# Patient Record
Sex: FEMALE | Race: WHITE
[De-identification: names, ages, dates, MRNs, and addresses within clinical notes are randomized per-mention and may not be internally consistent; named-entity substitution may affect disease eponyms.]

---

## 2020-03-21 ENCOUNTER — HOSPITAL ENCOUNTER (OUTPATIENT)
Dept: HOSPITAL 62 - LC | Age: 30
Discharge: HOME | End: 2020-03-21
Attending: OBSTETRICS & GYNECOLOGY
Payer: COMMERCIAL

## 2020-03-21 DIAGNOSIS — Z3A.38: ICD-10-CM

## 2020-03-21 DIAGNOSIS — O47.1: Primary | ICD-10-CM

## 2020-03-21 LAB
APPEARANCE UR: CLEAR
APTT PPP: (no result) S
BARBITURATES UR QL SCN: NEGATIVE
BILIRUB UR QL STRIP: NEGATIVE
GLUCOSE UR STRIP-MCNC: NEGATIVE MG/DL
KETONES UR STRIP-MCNC: (no result) MG/DL
METHADONE UR QL SCN: NEGATIVE
NITRITE UR QL STRIP: NEGATIVE
PCP UR QL SCN: NEGATIVE
PH UR STRIP: 6 [PH] (ref 5–9)
PROT UR STRIP-MCNC: NEGATIVE MG/DL
SP GR UR STRIP: 1
URINE AMPHETAMINES SCREEN: NEGATIVE
URINE BENZODIAZEPINES SCREEN: NEGATIVE
URINE COCAINE SCREEN: NEGATIVE
URINE MARIJUANA (THC) SCREEN: NEGATIVE
UROBILINOGEN UR-MCNC: NEGATIVE MG/DL (ref ?–2)

## 2020-03-21 PROCEDURE — 80307 DRUG TEST PRSMV CHEM ANLYZR: CPT

## 2020-03-21 PROCEDURE — 81005 URINALYSIS: CPT

## 2020-03-21 PROCEDURE — 59025 FETAL NON-STRESS TEST: CPT

## 2020-03-21 PROCEDURE — 4A1HXCZ MONITORING OF PRODUCTS OF CONCEPTION, CARDIAC RATE, EXTERNAL APPROACH: ICD-10-PCS | Performed by: OBSTETRICS & GYNECOLOGY

## 2020-03-21 NOTE — NON STRESS TEST REPORT
=================================================================

Non Stress Test

=================================================================

Datetime Report Generated by CPN: 03/21/2020 16:24

   

   

=================================================================

DEMOGRAPHIC

=================================================================

   

EGA NST:  38.0

   

=================================================================

INDICATION

=================================================================

   

Indication for Study (NST) Other:  contractions

   

=================================================================

VITAL SIGNS

=================================================================

   

Temperature - NST:  98.0

Pulse - NST:  58

RESP - NST:  18

NBPSYS NST:  127

NBPDIA NST:  77

   

=================================================================

MONITORING

=================================================================

   

Monitor Explained:  Monitor Explained; Test Explained; Patient

   Verbalized Understanding

Time on Monitor:  03/21/2020 13:26

Time off Monitor:  03/21/2020 16:04

NST Duration:  158

   

=================================================================

NST INTERVENTIONS

=================================================================

   

NST Interventions:  PO Hydration

Physician Notified NST:  Dr eaton

BABY A:  [id number]

   

=================================================================

BABY A

=================================================================

   

Fetal Movement :  Present

Contraction Frequency :  5-8

FHR Baseline :  130

Accelerations :  15X15

Decelerations :  None

Variability :  Moderate 6-25bpm

NST Review:  Meets Criteria for Reactive NST

NST Review and Verified By :  PENNY Goodman RN

NSCHATO Results:  Reactive

   

=================================================================

NST REPORT

=================================================================

   

Report Trigger:  Send Report

## 2020-03-22 ENCOUNTER — HOSPITAL ENCOUNTER (INPATIENT)
Dept: HOSPITAL 62 - LC | Age: 30
LOS: 2 days | Discharge: HOME | End: 2020-03-24
Attending: OBSTETRICS & GYNECOLOGY | Admitting: OBSTETRICS & GYNECOLOGY
Payer: COMMERCIAL

## 2020-03-22 ENCOUNTER — HOSPITAL ENCOUNTER (OUTPATIENT)
Dept: HOSPITAL 62 - LC | Age: 30
Discharge: HOME | End: 2020-03-22
Attending: OBSTETRICS & GYNECOLOGY
Payer: COMMERCIAL

## 2020-03-22 DIAGNOSIS — Z87.891: ICD-10-CM

## 2020-03-22 DIAGNOSIS — Z3A.38: ICD-10-CM

## 2020-03-22 DIAGNOSIS — Z91.410: ICD-10-CM

## 2020-03-22 DIAGNOSIS — Z79.899: ICD-10-CM

## 2020-03-22 DIAGNOSIS — O47.1: Primary | ICD-10-CM

## 2020-03-22 DIAGNOSIS — A60.00: ICD-10-CM

## 2020-03-22 LAB
ADD MANUAL DIFF: NO
APPEARANCE UR: CLEAR
APPEARANCE UR: CLEAR
APTT PPP: (no result) S
APTT PPP: YELLOW S
BARBITURATES UR QL SCN: NEGATIVE
BARBITURATES UR QL SCN: NEGATIVE
BASOPHILS # BLD AUTO: 0 10^3/UL (ref 0–0.2)
BASOPHILS NFR BLD AUTO: 0.1 % (ref 0–2)
BILIRUB UR QL STRIP: NEGATIVE
BILIRUB UR QL STRIP: NEGATIVE
EOSINOPHIL # BLD AUTO: 0 10^3/UL (ref 0–0.6)
EOSINOPHIL NFR BLD AUTO: 0.3 % (ref 0–6)
ERYTHROCYTE [DISTWIDTH] IN BLOOD BY AUTOMATED COUNT: 14.6 % (ref 11.5–14)
GLUCOSE UR STRIP-MCNC: NEGATIVE MG/DL
GLUCOSE UR STRIP-MCNC: NEGATIVE MG/DL
HCT VFR BLD CALC: 36.4 % (ref 36–47)
HGB BLD-MCNC: 12.4 G/DL (ref 12–15.5)
KETONES UR STRIP-MCNC: (no result) MG/DL
KETONES UR STRIP-MCNC: NEGATIVE MG/DL
LYMPHOCYTES # BLD AUTO: 1.9 10^3/UL (ref 0.5–4.7)
LYMPHOCYTES NFR BLD AUTO: 14.3 % (ref 13–45)
MCH RBC QN AUTO: 24.9 PG (ref 27–33.4)
MCHC RBC AUTO-ENTMCNC: 34 G/DL (ref 32–36)
MCV RBC AUTO: 73 FL (ref 80–97)
METHADONE UR QL SCN: NEGATIVE
METHADONE UR QL SCN: NEGATIVE
MONOCYTES # BLD AUTO: 0.6 10^3/UL (ref 0.1–1.4)
MONOCYTES NFR BLD AUTO: 4.5 % (ref 3–13)
NEUTROPHILS # BLD AUTO: 10.7 10^3/UL (ref 1.7–8.2)
NEUTS SEG NFR BLD AUTO: 80.8 % (ref 42–78)
NITRITE UR QL STRIP: NEGATIVE
NITRITE UR QL STRIP: NEGATIVE
PCP UR QL SCN: NEGATIVE
PCP UR QL SCN: NEGATIVE
PH UR STRIP: 6 [PH] (ref 5–9)
PH UR STRIP: 7 [PH] (ref 5–9)
PLATELET # BLD: 321 10^3/UL (ref 150–450)
PROT UR STRIP-MCNC: NEGATIVE MG/DL
PROT UR STRIP-MCNC: NEGATIVE MG/DL
RBC # BLD AUTO: 4.97 10^6/UL (ref 3.72–5.28)
SP GR UR STRIP: 1
SP GR UR STRIP: 1
TOTAL CELLS COUNTED % (AUTO): 100 %
URINE AMPHETAMINES SCREEN: NEGATIVE
URINE AMPHETAMINES SCREEN: NEGATIVE
URINE BENZODIAZEPINES SCREEN: NEGATIVE
URINE BENZODIAZEPINES SCREEN: NEGATIVE
URINE COCAINE SCREEN: NEGATIVE
URINE COCAINE SCREEN: NEGATIVE
URINE MARIJUANA (THC) SCREEN: NEGATIVE
URINE MARIJUANA (THC) SCREEN: NEGATIVE
UROBILINOGEN UR-MCNC: NEGATIVE MG/DL (ref ?–2)
UROBILINOGEN UR-MCNC: NEGATIVE MG/DL (ref ?–2)
WBC # BLD AUTO: 13.3 10^3/UL (ref 4–10.5)

## 2020-03-22 PROCEDURE — 80307 DRUG TEST PRSMV CHEM ANLYZR: CPT

## 2020-03-22 PROCEDURE — 0UQGXZZ REPAIR VAGINA, EXTERNAL APPROACH: ICD-10-PCS | Performed by: OBSTETRICS & GYNECOLOGY

## 2020-03-22 PROCEDURE — 85027 COMPLETE CBC AUTOMATED: CPT

## 2020-03-22 PROCEDURE — 59025 FETAL NON-STRESS TEST: CPT

## 2020-03-22 PROCEDURE — 86592 SYPHILIS TEST NON-TREP QUAL: CPT

## 2020-03-22 PROCEDURE — 81005 URINALYSIS: CPT

## 2020-03-22 PROCEDURE — 86901 BLOOD TYPING SEROLOGIC RH(D): CPT

## 2020-03-22 PROCEDURE — 4A1HXCZ MONITORING OF PRODUCTS OF CONCEPTION, CARDIAC RATE, EXTERNAL APPROACH: ICD-10-PCS | Performed by: OBSTETRICS & GYNECOLOGY

## 2020-03-22 PROCEDURE — 86850 RBC ANTIBODY SCREEN: CPT

## 2020-03-22 PROCEDURE — 36415 COLL VENOUS BLD VENIPUNCTURE: CPT

## 2020-03-22 PROCEDURE — 85025 COMPLETE CBC W/AUTO DIFF WBC: CPT

## 2020-03-22 PROCEDURE — 86900 BLOOD TYPING SEROLOGIC ABO: CPT

## 2020-03-22 RX ADMIN — PENICILLIN G POTASSIUM SCH MLS/HR: 5000000 POWDER, FOR SOLUTION INTRAMUSCULAR; INTRAPLEURAL; INTRATHECAL; INTRAVENOUS at 18:00

## 2020-03-22 RX ADMIN — ACETAMINOPHEN AND CODEINE PHOSPHATE PRN EACH: 300; 30 TABLET ORAL at 21:47

## 2020-03-22 RX ADMIN — IBUPROFEN SCH: 800 TABLET, FILM COATED ORAL at 21:49

## 2020-03-22 RX ADMIN — PENICILLIN G POTASSIUM SCH: 5000000 POWDER, FOR SOLUTION INTRAMUSCULAR; INTRAPLEURAL; INTRATHECAL; INTRAVENOUS at 22:44

## 2020-03-22 RX ADMIN — FAMOTIDINE SCH: 20 TABLET, FILM COATED ORAL at 21:50

## 2020-03-22 NOTE — NON STRESS TEST REPORT
=================================================================

Non Stress Test

=================================================================

Datetime Report Generated by CPN: 03/22/2020 05:04

   

   

=================================================================

DEMOGRAPHIC

=================================================================

   

EGA NST:  38.1

   

=================================================================

INDICATION

=================================================================

   

Indication for Study (NST) Other:  greater than 32 weeks

   

=================================================================

URINE RESULTS

=================================================================

   

Urine Protein, NST:  Negative

Urine Ketones - NST:  Negative

Urine Glucose - NST:  Negative

Urine Blood - NST:  Positive

   

=================================================================

MONITORING

=================================================================

   

Monitor Explained:  Monitor Explained; Test Explained; Patient

   Verbalized Understanding

Time on Monitor:  03/22/2020 02:32

Time off Monitor:  03/22/2020 04:30

NST Duration:  118

   

=================================================================

NST INTERVENTIONS

=================================================================

   

NST Interventions:  PO Hydration; Reposition Patient

Physician Notified NST:  Dr. Erickson

BABY A:  [id number]

   

=================================================================

BABY A

=================================================================

   

Fetal Movement :  Present

Contraction Frequency :  Irregular

FHR Baseline :  135

Accelerations :  15X15

Decelerations :  None

Variability :  Moderate 6-25bpm

NST Review:  Meets Criteria for Reactive NST

NST Review and Verified By :  YOLI Rosa RN

NST Results:  Reactive

   

=================================================================

NST REPORT

=================================================================

   

Report Trigger:  Send Report
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
## 2020-03-23 LAB
ERYTHROCYTE [DISTWIDTH] IN BLOOD BY AUTOMATED COUNT: 14.4 % (ref 11.5–14)
HCT VFR BLD CALC: 35.9 % (ref 36–47)
HGB BLD-MCNC: 11.9 G/DL (ref 12–15.5)
MCH RBC QN AUTO: 24.7 PG (ref 27–33.4)
MCHC RBC AUTO-ENTMCNC: 33.1 G/DL (ref 32–36)
MCV RBC AUTO: 75 FL (ref 80–97)
PLATELET # BLD: 282 10^3/UL (ref 150–450)
RBC # BLD AUTO: 4.81 10^6/UL (ref 3.72–5.28)
WBC # BLD AUTO: 15.3 10^3/UL (ref 4–10.5)

## 2020-03-23 RX ADMIN — DOCUSATE SODIUM SCH MG: 100 CAPSULE, LIQUID FILLED ORAL at 09:23

## 2020-03-23 RX ADMIN — DOCUSATE SODIUM SCH MG: 100 CAPSULE, LIQUID FILLED ORAL at 17:12

## 2020-03-23 RX ADMIN — FERROUS SULFATE TAB 325 MG (65 MG ELEMENTAL FE) SCH MG: 325 (65 FE) TAB at 17:12

## 2020-03-23 RX ADMIN — SENNOSIDES, DOCUSATE SODIUM SCH EACH: 50; 8.6 TABLET, FILM COATED ORAL at 09:23

## 2020-03-23 RX ADMIN — FAMOTIDINE SCH MG: 20 TABLET, FILM COATED ORAL at 22:38

## 2020-03-23 RX ADMIN — FAMOTIDINE SCH: 20 TABLET, FILM COATED ORAL at 10:11

## 2020-03-23 RX ADMIN — Medication SCH CAP: at 09:23

## 2020-03-23 RX ADMIN — ACETAMINOPHEN AND CODEINE PHOSPHATE PRN EACH: 300; 30 TABLET ORAL at 06:16

## 2020-03-23 RX ADMIN — FERROUS SULFATE TAB 325 MG (65 MG ELEMENTAL FE) SCH MG: 325 (65 FE) TAB at 09:23

## 2020-03-23 RX ADMIN — IBUPROFEN SCH: 800 TABLET, FILM COATED ORAL at 05:59

## 2020-03-23 RX ADMIN — IBUPROFEN SCH: 800 TABLET, FILM COATED ORAL at 13:56

## 2020-03-23 RX ADMIN — IBUPROFEN SCH MG: 800 TABLET, FILM COATED ORAL at 22:38

## 2020-03-23 NOTE — PDOC PROGRESS REPORT
Subjective-OB


Progress Note for:: 03/23/20


Subjective: 


Pt doing well, no concerns. She reports light bleeding, reg diet and voiding 

without difficulty. 





Physical Exam (OB)


Vital Signs: 


                                        











Temp Pulse Resp BP Pulse Ox


 


 98.0 F   74   16   121/81   99 


 


 03/23/20 07:24  03/23/20 07:24  03/23/20 07:24  03/23/20 07:24  03/23/20 07:24








                                 Intake & Output











 03/22/20 03/23/20 03/24/20





 06:59 06:59 06:59


 


Intake Total  1260 


 


Balance  1260 


 


Weight  88.4 kg 














- Abdomen


Description: Soft


Hernia Present: No


Fundal Description: Firm


Fundal Height: u/u - u/2





Objective-Diagnostic


Laboratory: 


                                        





                                 03/23/20 07:19 





                                        











  03/22/20 03/22/20 03/22/20





  13:49 14:50 14:50


 


WBC   13.3 H 


 


RBC   4.97 


 


Hgb   12.4 


 


Hct   36.4 


 


MCV   73 L 


 


MCH   24.9 L 


 


MCHC   34.0 


 


RDW   14.6 H 


 


Plt Count   321 


 


Seg Neutrophils %   80.8 H 


 


Urine Color  YELLOW  


 


Urine Appearance  CLEAR  


 


Urine pH  6.0  


 


Ur Specific Gravity  1.003  


 


Urine Protein  NEGATIVE  


 


Urine Glucose (UA)  NEGATIVE  


 


Urine Ketones  TRACE H  


 


Urine Blood  LARGE H  


 


Urine Nitrite  NEGATIVE  


 


Ur Leukocyte Esterase  SMALL H  


 


Blood Type    B POSITIVE


 


Antibody Screen    NEGATIVE














  03/23/20





  07:19


 


WBC  15.3 H


 


RBC  4.81


 


Hgb  11.9 L


 


Hct  35.9 L


 


MCV  75 L


 


MCH  24.7 L


 


MCHC  33.1


 


RDW  14.4 H


 


Plt Count  282


 


Seg Neutrophils % 


 


Urine Color 


 


Urine Appearance 


 


Urine pH 


 


Ur Specific Gravity 


 


Urine Protein 


 


Urine Glucose (UA) 


 


Urine Ketones 


 


Urine Blood 


 


Urine Nitrite 


 


Ur Leukocyte Esterase 


 


Blood Type 


 


Antibody Screen 














Assessment and Plan(PN)





- Assessment and Plan


(1) Vaginal delivery


Is this a current diagnosis for this admission?: Yes   





(2) Obstetric vaginal laceration


Qualifiers: 


   Perineal laceration presence: without perineal laceration   Qualified 

Code(s): O71.4 - Obstetric high vaginal laceration alone   


Is this a current diagnosis for this admission?: Yes   





(3) Normal postpartum course


Is this a current diagnosis for this admission?: Yes   





(4) Active labor at term


Is this a current diagnosis for this admission?: Yes   





- Time Spent with Patient


Time with patient: Less than 15 minutes


Medications reviewed and adjusted accordingly: Yes





- Disposition


Anticipated Discharge: Home


Within: within 24 hours

## 2020-03-24 VITALS — DIASTOLIC BLOOD PRESSURE: 67 MMHG | SYSTOLIC BLOOD PRESSURE: 112 MMHG

## 2020-03-24 RX ADMIN — DOCUSATE SODIUM SCH MG: 100 CAPSULE, LIQUID FILLED ORAL at 09:10

## 2020-03-24 RX ADMIN — Medication SCH CAP: at 09:10

## 2020-03-24 RX ADMIN — SENNOSIDES, DOCUSATE SODIUM SCH EACH: 50; 8.6 TABLET, FILM COATED ORAL at 09:10

## 2020-03-24 RX ADMIN — FERROUS SULFATE TAB 325 MG (65 MG ELEMENTAL FE) SCH MG: 325 (65 FE) TAB at 09:10

## 2020-03-24 NOTE — PDOC DISCHARGE SUMMARY
Impression





- Admit/DC Date/PCP


Admission Date/Primary Care Provider: 


  03/22/20 14:27





  LIDYA HERNÁNDEZ MD





Discharge Date: 03/24/20 - PP Day #2, doing well, no complaints, breastfeeding, 

B+, Rubella Immune





- Additional Information


Resuscitation Status: Full Code


Discharge Diet: As Tolerated


Discharge Activity: Activity As Tolerated, No Lifting Over 10 Pounds, Pelvic 

Rest


Referrals: 


LIDYA HERNÁNDEZ MD [Primary Care Provider] - 


Home Medications: 








Mesalamine [Lialda] 1.2 gm PO DAILY 03/21/20 


Prenatal Vits96/Iron Fum/Folic [Prenatal Tablet] 1 each PO DAILY 03/21/20 











HPI


Reason(s) for Admission: Onset of Labor


Prenatal Procedures: Ultrasound


Intrapartum Procedure(s): Spontaneous Vaginal Delivery


Postpartum Complication(s): Laceration-Vaginal





Results


Laboratory Results: 


                                        











WBC  15.3 10^3/uL (4.0-10.5)  H  03/23/20  07:19    


 


RBC  4.81 10^6/uL (3.72-5.28)   03/23/20  07:19    


 


Hgb  11.9 g/dL (12.0-15.5)  L  03/23/20  07:19    


 


Hct  35.9 % (36.0-47.0)  L  03/23/20  07:19    


 


MCV  75 fl (80-97)  L  03/23/20  07:19    


 


MCH  24.7 pg (27.0-33.4)  L  03/23/20  07:19    


 


MCHC  33.1 g/dL (32.0-36.0)   03/23/20  07:19    


 


RDW  14.4 % (11.5-14.0)  H  03/23/20  07:19    


 


Plt Count  282 10^3/uL (150-450)   03/23/20  07:19    


 


Lymph % (Auto)  14.3 % (13-45)   03/22/20  14:50    


 


Mono % (Auto)  4.5 % (3-13)   03/22/20  14:50    


 


Eos % (Auto)  0.3 % (0-6)   03/22/20  14:50    


 


Baso % (Auto)  0.1 % (0-2)   03/22/20  14:50    


 


Absolute Neuts (auto)  10.7 10^3/uL (1.7-8.2)  H  03/22/20  14:50    


 


Absolute Lymphs (auto)  1.9 10^3/uL (0.5-4.7)   03/22/20  14:50    


 


Absolute Monos (auto)  0.6 10^3/uL (0.1-1.4)   03/22/20  14:50    


 


Absolute Eos (auto)  0.0 10^3/uL (0.0-0.6)   03/22/20  14:50    


 


Absolute Basos (auto)  0.0 10^3/uL (0.0-0.2)   03/22/20  14:50    


 


Seg Neutrophils %  80.8 % (42-78)  H  03/22/20  14:50    


 


Urine Color  YELLOW   03/22/20  13:49    


 


Urine Appearance  CLEAR   03/22/20  13:49    


 


Urine pH  6.0  (5.0-9.0)   03/22/20  13:49    


 


Ur Specific Gravity  1.003   03/22/20  13:49    


 


Urine Protein  NEGATIVE mg/dL (NEGATIVE)   03/22/20  13:49    


 


Urine Glucose (UA)  NEGATIVE mg/dL (NEGATIVE)   03/22/20  13:49    


 


Urine Ketones  TRACE mg/dL (NEGATIVE)  H  03/22/20  13:49    


 


Urine Blood  LARGE  (NEGATIVE)  H  03/22/20  13:49    


 


Urine Nitrite  NEGATIVE  (NEGATIVE)   03/22/20  13:49    


 


Urine Bilirubin  NEGATIVE  (NEGATIVE)   03/22/20  13:49    


 


Urine Urobilinogen  NEGATIVE mg/dL (<2.0)   03/22/20  13:49    


 


Ur Leukocyte Esterase  SMALL  (NEGATIVE)  H  03/22/20  13:49    


 


Urine Ascorbic Acid  NEGATIVE  (NEGATIVE)   03/22/20  13:49    


 


Urine Opiates Screen  NEGATIVE   03/22/20  13:49    


 


Urine Methadone Screen  NEGATIVE   03/22/20  13:49    


 


Ur Barbiturates Screen  NEGATIVE   03/22/20  13:49    


 


Ur Phencyclidine Scrn  NEGATIVE   03/22/20  13:49    


 


Ur Amphetamines Screen  NEGATIVE   03/22/20  13:49    


 


U Benzodiazepines Scrn  NEGATIVE   03/22/20  13:49    


 


Urine Cocaine Screen  NEGATIVE   03/22/20  13:49    


 


U Marijuana (THC) Screen  NEGATIVE   03/22/20  13:49    


 


RPR  NONREACTIVE  (NONREACTIVE)   03/22/20  14:50    


 


Blood Type  B POSITIVE   03/22/20  14:50    


 


Antibody Screen  NEGATIVE   03/22/20  14:50    














Plan


Plan of Treatment: 


d/c home, f/up with [facility] in 4 wks for PP check

## 2020-03-26 NOTE — DELIVERY SUMMARY
=================================================================

Del Sum A-C

=================================================================

Datetime Report Generated by CPN: 2020 10:11

   

   

=================================================================

DELIVERY PERSONNEL

=================================================================

   

DELIVERY PERSONNEL:  [id number]

Delivery Doctor::  Gil Ericskon MD

Labor and Delivery Nurse::  Layla Hall RN

Labor and Delivery Nurse::  Ana Dumont RN

Nursery Nurse::  Shannon Campos RN

Nursery Nurse::  Elizabeth Sheth RN

Scrub Tech/CNA:  Nia Goff CST

Scrub Tech/CNA:  Rosana Gutiérrez, CST

Additional Personnel: :  Klarissa Brynn, CST

   

=================================================================

MATERNAL INFORMATION

=================================================================

   

Delivery Anesthesia:  Epidural

Medications After Delivery:  Pitocin Bolus-Please Comment

Meds After Delivery Comment:  pitocin 20 units in 1000mL nss

Delivery QBL:  150

Maternal Complications:  None

   

=================================================================

LABOR SUMMARY

=================================================================

   

EDC:  2020 00:00

No. Babies in Womb:  1

 Attempted:  No

Labor Anesthesia:  Epidural

   

=================================================================

LABOR INFORMATION

=================================================================

   

Reason for Induction:  Not Applicable

Onset of Labor:  2020 14:17

Complete Dilatation:  2020 18:26

Oxytocin:  Augmentation

Group B Beta Strep:  positive

Group B Beta Strep:  Positive

Antibiotics # of Doses:  2

Antibiotics Time of Last Dose:  1800

Name of Antibiotic Given:  PCN

Steroids Given:  None

Reason Steroids Not Administered:  Not Applicable

   

=================================================================

MEMBRANES

=================================================================

   

Membranes Rupture Method:  Spontaneous

Rupture of Membranes:  2020 14:17

Length of Rupture (hr):  4.78

Amniotic Fluid Color:  Bloody

Amniotic Fluid Amount:  Small

Amniotic Fluid Odor:  None

   

=================================================================

STAGES OF LABOR

=================================================================

   

Stage 1 hr:  4

Stage 1 min:  9

Stage 2 hr:  0

Stage 2 min:  38

Stage 3 hr:  0

Stage 3 min:  5

Total Time in Labor hr:  4

Total Time in Labor min:  52

   

=================================================================

VAGINAL DELIVERY

=================================================================

   

Episiotomy:  None

Laceration #1:  Vaginal

Laceration Extension #1:  N/A

Laceration Repair:  Yes

Laceration Repair Note:  repair 2-0 vicryl

Sponge Count Correct:  Yes

Sharps Count Correct:  Yes

   

=================================================================

CSECTION DELIVERY

=================================================================

   

Primary Indication:  N/A

Secondary Indication:  N/A

CSection Incidence:  N/A

Labor:  N/A

Elective:  N/A

CSection Incision:  N/A

   

=================================================================

BABY A INFORMATION

=================================================================

   

Infant Delivery Date/Time:  2020 19:04

Method of Delivery:  Vaginal

Method of Delivery:  Vaginal

Nurse Controlled Delivery:  No

Born in Route :  No

:  N/A

Forceps:  N/A

Vacuum Extraction:  Successful

Shoulder Dystocia :  No

   

=================================================================

ASSISTED DELIVERY BABY A

=================================================================

   

Indication for Assisted Delivery:  fetal distress

Catheter Prior to Procedure:  No

Position Vacuum/Forcep Apply:  Left Occipital Anterior

Vacuum Number of Pulls:  3

Vacuum Number of PopOffs:  0

Reduce Pressure btwn Ctx:  Yes

Vacuum :  KIWI

   

=================================================================

PRESENTATION/POSITION BABY A

=================================================================

   

Presentation:  Cephalic

Cephalic Presentation:  Vertex

Vertex Position:  Left Occipital Anterior

Breech Presentation:  N/A

   

=================================================================

PLACENTA INFORMATION BABY A

=================================================================

   

Placenta Delivery Time :  2020 19:09

Placenta Method of Delivery:  Spontaneous

Placenta Status:  Delivered

   

=================================================================

APGAR SCORES BABY A

=================================================================

   

Heart Rate 1 min:  >100 bpm

Resp Effort 1 min:  Good Cry

Reflex Irritability 1 min:  Cough or Sneeze or Pulls Away

Muscle Tone 1 min:  Active Motion

Color 1 min:  Body Pink, Extremities Blue

Resuscitation Effort 1 min:  Tactile Stimulation

APGAR SCORE 1 MIN:  9

Heart Rate 5 min:  >100 bpm

Resp Effort 5 min:  Good Cry

Reflex Irritability 5 min:  Cough or Sneeze or Pulls Away

Muscle Tone 5 min:  Active Motion

Color 5 min:  Body Pink, Extremities Blue

Resuscitation Effort 5 min:  N/A

APGAR SCORE 5 MIN:  9

   

=================================================================

INFANT INFORMATION BABY A

=================================================================

   

Gestational Age at Delivery:  38.1

Gestational Status:  Early Term- 37- 38.6 Weeks

Infant Outcome :  Liveborn

Infant Condition :  Stable

Infant Sex:  Female

Infant Sex:  Female

   

=================================================================

IDENTIFICATION BABY A

=================================================================

   

Infant Verification Date/Time:  2020 19:29

ID Band Number:  [id number]

Mother's Name Verified:  Yes

Infant Medical Record Number:  [id number]

RN Verifying Infant:  rn danny and rn alan

   

=================================================================

WEIGHT/LENGTH BABY A

=================================================================

   

Infant Birthweight (gm):  2772

Infant Weight (lb):  6

Infant Weight (oz):  2

Infant Length (in):  18.50

Infant Length (cm):  46.99

   

=================================================================

CORD INFORMATION BABY A

=================================================================

   

No. Cord Vessels:  3

Nuchal Cord :  N/A

Cord Blood Taken:  Yes-For Storage (Mom's Blood type +)

Cord Blood Taken:  Yes-For Storage (Mom's Blood type +)

Infant Suction:  None

   

=================================================================

ASSESSMENT BABY A

=================================================================

   

Infant Complications:  None

Physical Findings at Delivery:  Within Normal Limits

Infant Respirations:  Appears Normal

Skin to Skin:  Yes

Skin to Skin Time (min):  60

Neonatologist/ALS Called :  No

Infant Care By:  ADORE Campos RN

Transferred To:  Remains with Mother

   

=================================================================

BABY B INFORMATION

=================================================================

   

 :  N/A

   

=================================================================

SIGNATURES

=================================================================

   

Signature:  Electronically signed by Gil Erickson MD ([email or web address]) on

   3/22/2020 at 19:16  with User ID: [email or web address]